# Patient Record
(demographics unavailable — no encounter records)

---

## 2025-07-28 NOTE — REVIEW OF SYSTEMS
[Seasonal Allergies] : seasonal allergies [Negative] : Psychiatric [Cough] : no cough [Hemoptysis] : no hemoptysis [Chest Tightness] : no chest tightness [Sputum] : no sputum [Dyspnea] : no dyspnea [Wheezing] : no wheezing [SOB on Exertion] : no sob on exertion [TextBox_14] : chronic throat clearing [TextBox_44] : s/p PTCA stent June 2025, HTN [TextBox_141] : Hashimoto thyroiditis, PREDM

## 2025-07-28 NOTE — HISTORY OF PRESENT ILLNESS
[Awakes Unrefreshed] : awakes unrefreshed [Awakes with Dry Mouth] : awakes with dry mouth [Awakes with Headache] : awakes with headache [Daytime Somnolence] : daytime somnolence [Frequent Nocturnal Awakening] : frequent nocturnal awakening [Snoring] : snoring [Unintentional Sleep while Inactive] : unintentional sleep while inactive [Unusual Movements] : unusual movements [Witnessed Apneas] : witnessed apneas [Former] : former [< 20 pack-years] : < 20 pack-years [TextBox_4] : 66-year-old patient Pulmonary sleep evaluation Past medical history Hypertension Status post ASHD with a left main PTCA stent [facility] June 27, 2025 Patient prior surgical as per protocol  Poor sleep hygiene Patient takes Xanax 0.5 mg at bedtime with Ambien 10 mg otherwise patient states she does not sleep Still has frequent sleep awakenings Describes what sounds like witnessed apneic episodes Loud snoring Daytime fatigue sleepiness Awakens with dry mouth No formal diagnosis previously of obstructive sleep apnea  Chief complaint exertional dyspnea States he still has shortness of breath post placement of stent Does not associate with any pleuritic chest pain Does not associate with any cough wheeze purulent sputum No reported history of cold weather symptomatology Does have positive seasonal allergies   History of pneumonia dating back approximately 8 years ago hospitalized [facility] History of childhood pneumonia No reported history of COPD asthma emphysema interstitial lung disease latent tuberculosis  Former tobacco smoker Discontinued tobacco approximate 30 years ago 1995 States smoked previously 1 pack x 10 years  [TextBox_11] : 1 [TextBox_13] : 10 [YearQuit] : 1995

## 2025-07-28 NOTE — PROCEDURE
[FreeTextEntry1] : Chest x-ray PA lateral July 28, 2025 Heart size normal No parenchymal infiltrate pleural effusion dominant pulmonary nodule Soft tissue bony structures unremarkable Jocy mediastinum unremarkable Overall impression clear lungs  NIOX 24 upper limits but normal range July 28, 2025  PFT July 28, 2025 Spirometry normal No bronchodilator response at FEV1 Lung volumes are normal Total lung capacity 86% predicted No air trapping Diffusion low normal range 78% predicted Hemoglobin 14.9  Pulmonary 6-minute walk exercise study July 28, 2025 Baseline room air O2 saturation 96% Normal study on room air No kris desaturation No exercise-induced hypoxemia

## 2025-07-28 NOTE — DISCUSSION/SUMMARY
[FreeTextEntry1] : History as noted above High [place] score High clinical suspicion poor sleep hygiene rule out obstructive sleep apnea I did advise patient that is not the time to try to adjust medications that he uses for sleep including the Xanax and the Ambien Formal 2 night watermark home sleep study Addressed treatment protocols if documented obstructive sleep apnea which can account for his symptomatology including exertional dyspnea As noted he will remain on sedatives at present But should avoid alcohol heavy meals before bedtime Treatment protocols focusing primarily on CPAP discussed  Cardiac history as noted post ASHD left main Hypertension  Also noted there will be physiologic benefit for treatment of hypertension if documented obstructive sleep apnea on CPAP

## 2025-07-28 NOTE — PHYSICAL EXAM
[No Acute Distress] : no acute distress [Normal Oropharynx] : normal oropharynx [III] : Mallampati Class: III [Normal Appearance] : normal appearance [Supple] : supple [No Neck Mass] : no neck mass [No JVD] : no jvd [Normal Rate/Rhythm] : normal rate/rhythm [Normal S1, S2] : normal s1, s2 [No Murmurs] : no murmurs [No Rubs] : no rubs [No Gallops] : no gallops [No Resp Distress] : no resp distress [No Acc Muscle Use] : no acc muscle use [Normal Palpation] : normal palpation [Normal Rhythm and Effort] : normal rhythm and effort [Clear to Auscultation Bilaterally] : clear to auscultation bilaterally [Normal to Percussion] : normal to percussion [No Abnormalities] : no abnormalities [Benign] : benign [Not Tender] : not tender [Soft] : soft [No HSM] : no hsm [Normal Bowel Sounds] : normal bowel sounds [Normal Gait] : normal gait [No Clubbing] : no clubbing [No Cyanosis] : no cyanosis [No Edema] : no edema [FROM] : FROM [Normal Color/ Pigmentation] : normal color/ pigmentation [No Focal Deficits] : no focal deficits [Oriented x3] : oriented x3 [Normal Affect] : normal affect